# Patient Record
Sex: MALE | Race: OTHER | HISPANIC OR LATINO | URBAN - METROPOLITAN AREA
[De-identification: names, ages, dates, MRNs, and addresses within clinical notes are randomized per-mention and may not be internally consistent; named-entity substitution may affect disease eponyms.]

---

## 2021-12-10 ENCOUNTER — OFFICE (OUTPATIENT)
Dept: URBAN - METROPOLITAN AREA CLINIC 79 | Facility: CLINIC | Age: 31
End: 2021-12-10

## 2021-12-10 VITALS
TEMPERATURE: 97.2 F | DIASTOLIC BLOOD PRESSURE: 92 MMHG | SYSTOLIC BLOOD PRESSURE: 139 MMHG | HEIGHT: 71 IN | WEIGHT: 220 LBS | HEART RATE: 90 BPM

## 2021-12-10 DIAGNOSIS — Z90.49 ACQUIRED ABSENCE OF OTHER SPECIFIED PARTS OF DIGESTIVE TRACT: ICD-10-CM

## 2021-12-10 DIAGNOSIS — K76.0 FATTY (CHANGE OF) LIVER, NOT ELSEWHERE CLASSIFIED: ICD-10-CM

## 2021-12-10 DIAGNOSIS — R10.11 RIGHT UPPER QUADRANT PAIN: ICD-10-CM

## 2021-12-10 DIAGNOSIS — K21.9 GASTRO-ESOPHAGEAL REFLUX DISEASE WITHOUT ESOPHAGITIS: ICD-10-CM

## 2021-12-10 PROCEDURE — 99244 OFF/OP CNSLTJ NEW/EST MOD 40: CPT

## 2021-12-10 RX ORDER — PANTOPRAZOLE SODIUM 40 MG/1
40 TABLET, DELAYED RELEASE ORAL
Qty: 90 | Refills: 1 | Status: ACTIVE
Start: 2021-12-10

## 2021-12-10 NOTE — SERVICEHPINOTES
CATHERINE JACKSON   is a   31   year old male who is being seen in consultation at the request of   PHONG GARRETT   for symptoms suspicious for acid reflux disease. He reports developing  "heartburn" in April 2020. He was advised to try OTC medications as well as short-course of prescribed PPI but was not improving. In December 2020 he had an EGD at [facility] in [place], [place]. Per patient, "everything looked normal". Around the same time he started having RUQ pain. He had a cholecystectomy in April 2021 and heartburn seemed to resolve. 
br
br Patient reports sx recurred about 2 months later. Currently he is having frequent heartburn throughout the day. Associated sx include burning sensation in the throat, globus sensation and throat clearing. 
br He is also c/o of R "side pain" similar to what he was experiencing prior to gallbladder surgery but less intense and in shorter episodes. There is no association with meals. He has fatty liver and is concerned this could be the cause of this pain. [facility] due to lack of insurance.
br He did not bring any records today.
br
br Denies dysphagia, odynophagia, nausea, vomiting, diarrhea, constipation. 
brNo unintentional weight loss, fevers, chills, night sweats. 
br No chronic NSAID use. 
br No etoh or IV drug use.br visited="true"

## 2022-02-10 ENCOUNTER — OFFICE (OUTPATIENT)
Dept: URBAN - METROPOLITAN AREA CLINIC 79 | Facility: CLINIC | Age: 32
End: 2022-02-10

## 2022-02-10 VITALS
SYSTOLIC BLOOD PRESSURE: 126 MMHG | HEIGHT: 71 IN | WEIGHT: 215 LBS | HEART RATE: 73 BPM | DIASTOLIC BLOOD PRESSURE: 86 MMHG | TEMPERATURE: 97.2 F

## 2022-02-10 DIAGNOSIS — K21.9 GASTRO-ESOPHAGEAL REFLUX DISEASE WITHOUT ESOPHAGITIS: ICD-10-CM

## 2022-02-10 PROCEDURE — 99214 OFFICE O/P EST MOD 30 MIN: CPT

## 2022-02-10 RX ORDER — FAMOTIDINE 40 MG/1
TABLET, FILM COATED ORAL
Qty: 90 | Refills: 1 | Status: ACTIVE
Start: 2022-02-10

## 2022-02-10 NOTE — SERVICEHPINOTES
31 year old male following up for "heartburn". Initial eval 12/10/21. At the time, patient reported frequent heartburn throughout the day assoc with burning sensation in throat, globus sensation and throat clearing. He had tried OTC meds without improvement. He had these sx in the past, onset 2020, he had an EGD at [facility] (8/2020) which was unremarkable. Sx had improved after cholecystectomy in 2021, then recurred. He was also experiencing RUQ discomfort and was concerned about "fatty liver".br --Patient was advised to implement diet and lifestyle modifications and was started on PPI BID.
br 
--Ordered labs, patient completed 12/10/21: CMP and lipase wnl.br
br Today he reports mild improvement to GERD sx. Having heartburn still, but less of the throat discomfort. The abdominal pain has resolved. He is c/o sharp pain in the back when taking a deep breath, and feeling like he is unable to fully expand lungs. 
Denies dysphagia, odynophagia, nausea, vomiting, diarrhea, constipation.brNo unintentional weight loss, fevers, chills, night sweats. br visited="true"